# Patient Record
Sex: FEMALE | Race: BLACK OR AFRICAN AMERICAN | NOT HISPANIC OR LATINO | Employment: PART TIME | URBAN - METROPOLITAN AREA
[De-identification: names, ages, dates, MRNs, and addresses within clinical notes are randomized per-mention and may not be internally consistent; named-entity substitution may affect disease eponyms.]

---

## 2024-08-16 ENCOUNTER — HOSPITAL ENCOUNTER (EMERGENCY)
Facility: HOSPITAL | Age: 19
Discharge: HOME | End: 2024-08-16
Attending: EMERGENCY MEDICINE
Payer: COMMERCIAL

## 2024-08-16 VITALS
OXYGEN SATURATION: 100 % | WEIGHT: 212.08 LBS | HEART RATE: 79 BPM | SYSTOLIC BLOOD PRESSURE: 138 MMHG | DIASTOLIC BLOOD PRESSURE: 78 MMHG | BODY MASS INDEX: 33.29 KG/M2 | HEIGHT: 67 IN | RESPIRATION RATE: 18 BRPM | TEMPERATURE: 98 F

## 2024-08-16 DIAGNOSIS — K62.89 RECTAL PAIN: ICD-10-CM

## 2024-08-16 DIAGNOSIS — K64.4 EXTERNAL HEMORRHOIDS: Primary | ICD-10-CM

## 2024-08-16 PROCEDURE — 99284 EMERGENCY DEPT VISIT MOD MDM: CPT | Performed by: EMERGENCY MEDICINE

## 2024-08-16 PROCEDURE — 99282 EMERGENCY DEPT VISIT SF MDM: CPT

## 2024-08-16 RX ORDER — HYDROCORTISONE 25 MG/G
CREAM TOPICAL ONCE
Status: COMPLETED | OUTPATIENT
Start: 2024-08-16 | End: 2024-08-16

## 2024-08-16 RX ORDER — IBUPROFEN 600 MG/1
600 TABLET, FILM COATED ORAL ONCE
Status: COMPLETED | OUTPATIENT
Start: 2024-08-16 | End: 2024-08-16

## 2024-08-16 RX ORDER — HYDROCORTISONE 25 MG/G
CREAM TOPICAL 2 TIMES DAILY
Qty: 28 G | Refills: 0 | Status: SHIPPED | OUTPATIENT
Start: 2024-08-16

## 2024-08-16 RX ADMIN — WITCH HAZEL 1 PAD: 500 SOLUTION RECTAL; TOPICAL at 19:56

## 2024-08-16 RX ADMIN — IBUPROFEN 600 MG: 600 TABLET, FILM COATED ORAL at 19:56

## 2024-08-16 RX ADMIN — HYDROCORTISONE: 25 CREAM TOPICAL at 19:56

## 2024-08-16 NOTE — Clinical Note
Opal Roman was seen and treated in our emergency department on 8/16/2024.                Diagnosis:     Opal  may return to work on return date.    She may return on this date: 08/19/2024         If you have any questions or concerns, please don't hesitate to call.      Nadia Stock, DO    ______________________________           _______________          _______________  Hospital Representative                              Date                                Time

## 2024-08-16 NOTE — ED PROVIDER NOTES
History  Chief Complaint   Patient presents with    Hemorrhoids     C/o anal pain due to hemorrhoids. Said she has had them since she was pregnant but recently they've become painful     Patient is a 19-year-old female with no significant past medical or surgical history, presents to the emergency department complaining of rectal pain related to known external hemorrhoids.  Patient states that she started getting external hemorrhoids during her last pregnancy about 1 year ago.  Her child is now 9 months and she keeps getting hemorrhoids on and off.  She states over the past week the hemorrhoids have been very painful despite taking Tylenol.  She has not tried any topical medication for the hemorrhoids.  She states it hurts to have a bowel movement so she is trying to avoid bowel movements but denies being constipated or straining.  Denies any rectal bleeding, melena, diarrhea, abdominal pain, nausea, vomiting, urinary symptoms, chest pain, palpitations, dyspnea, cough, URI symptoms, fevers or chills.      History provided by:  Patient   used: No        None       History reviewed. No pertinent past medical history.    History reviewed. No pertinent surgical history.    History reviewed. No pertinent family history.  I have reviewed and agree with the history as documented.    E-Cigarette/Vaping    E-Cigarette Use Current Some Day User      E-Cigarette/Vaping Substances     Social History     Tobacco Use    Smoking status: Never    Smokeless tobacco: Never   Vaping Use    Vaping status: Some Days   Substance Use Topics    Alcohol use: Never    Drug use: Yes     Types: Marijuana       Review of Systems   Constitutional:  Negative for chills and fever.   HENT:  Negative for congestion, ear pain, rhinorrhea and sore throat.    Respiratory:  Negative for cough, chest tightness, shortness of breath and wheezing.    Cardiovascular:  Negative for chest pain and palpitations.   Gastrointestinal:  Positive  for rectal pain. Negative for abdominal distention, abdominal pain, blood in stool, constipation, diarrhea, nausea and vomiting.   Genitourinary:  Negative for dysuria, flank pain, frequency, hematuria, pelvic pain, vaginal discharge and vaginal pain.   Musculoskeletal:  Negative for back pain and neck pain.   Skin:  Negative for color change, pallor, rash and wound.   Allergic/Immunologic: Negative for immunocompromised state.   Neurological:  Negative for dizziness, weakness, light-headedness, numbness and headaches.   Hematological:  Negative for adenopathy. Does not bruise/bleed easily.   Psychiatric/Behavioral:  Negative for confusion and decreased concentration.    All other systems reviewed and are negative.      Physical Exam  Physical Exam  Vitals and nursing note reviewed.   Constitutional:       General: She is not in acute distress.     Appearance: Normal appearance. She is well-developed. She is not ill-appearing, toxic-appearing or diaphoretic.   HENT:      Head: Normocephalic and atraumatic.      Right Ear: External ear normal.      Left Ear: External ear normal.      Mouth/Throat:      Mouth: Mucous membranes are moist.      Pharynx: Oropharynx is clear.   Eyes:      Extraocular Movements: Extraocular movements intact.      Conjunctiva/sclera: Conjunctivae normal.   Neck:      Vascular: No JVD.   Cardiovascular:      Rate and Rhythm: Normal rate and regular rhythm.      Pulses: Normal pulses.      Heart sounds: Normal heart sounds. No murmur heard.     No friction rub. No gallop.   Pulmonary:      Effort: Pulmonary effort is normal. No respiratory distress.      Breath sounds: Normal breath sounds. No wheezing, rhonchi or rales.   Abdominal:      General: There is no distension.      Palpations: Abdomen is soft.      Tenderness: There is no abdominal tenderness. There is no guarding or rebound.   Genitourinary:     Comments: Small partially collapsed and nonthrombosed external hemorrhoids.  No anal  "fissures or thrombosed hemorrhoids.  Musculoskeletal:         General: No swelling or tenderness. Normal range of motion.      Cervical back: Normal range of motion and neck supple. No rigidity.   Skin:     General: Skin is warm and dry.      Coloration: Skin is not pale.      Findings: No erythema or rash.   Neurological:      General: No focal deficit present.      Mental Status: She is alert and oriented to person, place, and time.      Sensory: No sensory deficit.      Motor: No weakness.   Psychiatric:         Mood and Affect: Mood normal.         Behavior: Behavior normal.         Vital Signs  ED Triage Vitals [08/16/24 1844]   Temperature Pulse Respirations Blood Pressure SpO2   98 °F (36.7 °C) 79 18 138/78 100 %      Temp Source Heart Rate Source Patient Position - Orthostatic VS BP Location FiO2 (%)   Tympanic Monitor Sitting Left arm --      Pain Score       --         Vitals:    08/16/24 1844   BP: 138/78   BP Location: Left arm   Pulse: 79   Resp: 18   Temp: 98 °F (36.7 °C)   TempSrc: Tympanic   SpO2: 100%   Weight: 96.2 kg (212 lb 1.3 oz)   Height: 5' 7\" (1.702 m)          Visual Acuity      ED Medications  Medications   hydrocortisone (ANUSOL-HC) 2.5 % rectal cream ( Topical Given 8/16/24 1956)   witch hazel-glycerin (TUCKS) topical pad 1 Pad (1 Pad Topical Given 8/16/24 1956)   ibuprofen (MOTRIN) tablet 600 mg (600 mg Oral Given 8/16/24 1956)       Diagnostic Studies  Results Reviewed       None                   No orders to display              Procedures  Procedures         ED Course                                 SBIRT 22yo+      Flowsheet Row Most Recent Value   Initial Alcohol Screen: US AUDIT-C     1. How often do you have a drink containing alcohol? 0 Filed at: 08/16/2024 1846   2. How many drinks containing alcohol do you have on a typical day you are drinking?  0 Filed at: 08/16/2024 1846   3b. FEMALE Any Age, or MALE 65+: How often do you have 4 or more drinks on one occassion? 0 Filed at: " 08/16/2024 1846   Audit-C Score 0 Filed at: 08/16/2024 1846   ABENA: How many times in the past year have you...    Used an illegal drug or used a prescription medication for non-medical reasons? Never Filed at: 08/16/2024 1846                      Medical Decision Making  19-year-old female presents to the ED for rectal pain associated with external hemorrhoids.  Hemorrhoids are visible, nonthrombosed and will treat symptomatically with topical hydrocortisone rectal cream and witch hazel pads.  Will also give ibuprofen for acute pain relief.  Will prescribe these medications to be used at home and also recommended sitz baths.  Advised using stool softeners as needed to avoid straining which could exacerbate hemorrhoids.  Discussed ED return parameters.    Risk  OTC drugs.  Prescription drug management.                 Disposition  Final diagnoses:   External hemorrhoids   Rectal pain     Time reflects when diagnosis was documented in both MDM as applicable and the Disposition within this note       Time User Action Codes Description Comment    8/16/2024  7:29 PM Nadia Stock [K64.4] External hemorrhoids     8/16/2024  7:54 PM Nadia Stock [K62.89] Rectal pain           ED Disposition       ED Disposition   Discharge    Condition   Stable    Date/Time   Fri Aug 16, 2024 1929    Comment   Opal Roman discharge to home/self care.                   Follow-up Information       Follow up With Specialties Details Why Contact Info Additional Information    [facility] [place] [facility] Schedule an appointment as soon as possible for a visit  As needed [address]  [address]  [facility] [address] [phone] [facility] [place], [facility] [address], [place], [place], [address] [phone]     [facility] Emergency Department Emergency Medicine Go to  If symptoms worsen [address] [facility]  [facility] [address]  [phone] [facility] Emergency [facility], [address] [place], [place], [address]            Patient's Medications   Discharge Prescriptions    HYDROCORTISONE (ANUSOL-HC) 2.5 % RECTAL CREAM    Apply topically 2 (two) times a day       Start Date: 8/16/2024 End Date: --       Order Dose: --       Quantity: 28 g    Refills: 0    WITCH HAZEL-GLYCERIN (TUCKS) TOPICAL PAD    Insert 1 Pad into the rectum as needed for irritation       Start Date: 8/16/2024 End Date: --       Order Dose: 1 Pad       Quantity: 20 each    Refills: 0       No discharge procedures on file.    PDMP Review       None            ED Provider  Electronically Signed by             Nadia Stock DO  08/16/24 1959

## 2024-12-16 ENCOUNTER — HOSPITAL ENCOUNTER (EMERGENCY)
Facility: HOSPITAL | Age: 19
Discharge: HOME | End: 2024-12-16
Payer: COMMERCIAL

## 2024-12-16 ENCOUNTER — OFFICE VISIT (OUTPATIENT)
Dept: URGENT CARE | Facility: CLINIC | Age: 19
End: 2024-12-16
Payer: COMMERCIAL

## 2024-12-16 VITALS
WEIGHT: 220 LBS | DIASTOLIC BLOOD PRESSURE: 72 MMHG | SYSTOLIC BLOOD PRESSURE: 141 MMHG | RESPIRATION RATE: 20 BRPM | HEART RATE: 93 BPM | OXYGEN SATURATION: 100 % | TEMPERATURE: 98 F | BODY MASS INDEX: 34.46 KG/M2

## 2024-12-16 VITALS
SYSTOLIC BLOOD PRESSURE: 124 MMHG | DIASTOLIC BLOOD PRESSURE: 76 MMHG | TEMPERATURE: 97.4 F | WEIGHT: 220 LBS | BODY MASS INDEX: 34.46 KG/M2 | OXYGEN SATURATION: 99 % | HEART RATE: 97 BPM | RESPIRATION RATE: 18 BRPM

## 2024-12-16 DIAGNOSIS — R10.84 GENERALIZED ABDOMINAL PAIN: Primary | ICD-10-CM

## 2024-12-16 DIAGNOSIS — Z34.93 PREGNANT AND NOT YET DELIVERED, THIRD TRIMESTER: Primary | ICD-10-CM

## 2024-12-16 DIAGNOSIS — Z32.01 POSITIVE URINE PREGNANCY TEST: ICD-10-CM

## 2024-12-16 DIAGNOSIS — K21.9 GERD (GASTROESOPHAGEAL REFLUX DISEASE): ICD-10-CM

## 2024-12-16 LAB
ALBUMIN SERPL BCG-MCNC: 3.7 G/DL (ref 3.5–5)
ALP SERPL-CCNC: 118 U/L (ref 34–104)
ALT SERPL W P-5'-P-CCNC: 11 U/L (ref 7–52)
ANION GAP SERPL CALCULATED.3IONS-SCNC: 6 MMOL/L (ref 4–13)
AST SERPL W P-5'-P-CCNC: 16 U/L (ref 13–39)
B-HCG SERPL-ACNC: ABNORMAL MIU/ML (ref 0–5)
BACTERIA UR QL AUTO: ABNORMAL /HPF
BASOPHILS # BLD AUTO: 0.02 THOUSANDS/ΜL (ref 0–0.1)
BASOPHILS NFR BLD AUTO: 0 % (ref 0–1)
BILIRUB SERPL-MCNC: 0.33 MG/DL (ref 0.2–1)
BILIRUB UR QL STRIP: NEGATIVE
BUN SERPL-MCNC: 6 MG/DL (ref 5–25)
CALCIUM SERPL-MCNC: 9.2 MG/DL (ref 8.4–10.2)
CARDIAC TROPONIN I PNL SERPL HS: 4 NG/L (ref ?–50)
CHLORIDE SERPL-SCNC: 104 MMOL/L (ref 96–108)
CLARITY UR: CLEAR
CO2 SERPL-SCNC: 24 MMOL/L (ref 21–32)
COLOR UR: YELLOW
CREAT SERPL-MCNC: 0.52 MG/DL (ref 0.6–1.3)
EOSINOPHIL # BLD AUTO: 0.03 THOUSAND/ΜL (ref 0–0.61)
EOSINOPHIL NFR BLD AUTO: 0 % (ref 0–6)
ERYTHROCYTE [DISTWIDTH] IN BLOOD BY AUTOMATED COUNT: 13.1 % (ref 11.6–15.1)
EXT PREGNANCY TEST URINE: POSITIVE
EXT. CONTROL: ABNORMAL
FLUAV AG UPPER RESP QL IA.RAPID: NEGATIVE
FLUBV AG UPPER RESP QL IA.RAPID: NEGATIVE
GFR SERPL CREATININE-BSD FRML MDRD: 137 ML/MIN/1.73SQ M
GLUCOSE SERPL-MCNC: 77 MG/DL (ref 65–140)
GLUCOSE UR STRIP-MCNC: NEGATIVE MG/DL
HCT VFR BLD AUTO: 34.7 % (ref 34.8–46.1)
HGB BLD-MCNC: 11.5 G/DL (ref 11.5–15.4)
HGB UR QL STRIP.AUTO: NEGATIVE
IMM GRANULOCYTES # BLD AUTO: 0.02 THOUSAND/UL (ref 0–0.2)
IMM GRANULOCYTES NFR BLD AUTO: 0 % (ref 0–2)
KETONES UR STRIP-MCNC: ABNORMAL MG/DL
LEUKOCYTE ESTERASE UR QL STRIP: NEGATIVE
LIPASE SERPL-CCNC: 13 U/L (ref 11–82)
LYMPHOCYTES # BLD AUTO: 2.94 THOUSANDS/ΜL (ref 0.6–4.47)
LYMPHOCYTES NFR BLD AUTO: 33 % (ref 14–44)
MCH RBC QN AUTO: 30 PG (ref 26.8–34.3)
MCHC RBC AUTO-ENTMCNC: 33.1 G/DL (ref 31.4–37.4)
MCV RBC AUTO: 91 FL (ref 82–98)
MONOCYTES # BLD AUTO: 0.64 THOUSAND/ΜL (ref 0.17–1.22)
MONOCYTES NFR BLD AUTO: 7 % (ref 4–12)
MUCOUS THREADS UR QL AUTO: ABNORMAL
NEUTROPHILS # BLD AUTO: 5.37 THOUSANDS/ΜL (ref 1.85–7.62)
NEUTS SEG NFR BLD AUTO: 60 % (ref 43–75)
NITRITE UR QL STRIP: NEGATIVE
NON-SQ EPI CELLS URNS QL MICRO: ABNORMAL /HPF
NRBC BLD AUTO-RTO: 0 /100 WBCS
PH UR STRIP.AUTO: 6.5 [PH]
PLATELET # BLD AUTO: 230 THOUSANDS/UL (ref 149–390)
PMV BLD AUTO: 8.9 FL (ref 8.9–12.7)
POTASSIUM SERPL-SCNC: 3.4 MMOL/L (ref 3.5–5.3)
PROT SERPL-MCNC: 7.4 G/DL (ref 6.4–8.4)
PROT UR STRIP-MCNC: ABNORMAL MG/DL
RBC # BLD AUTO: 3.83 MILLION/UL (ref 3.81–5.12)
RBC #/AREA URNS AUTO: ABNORMAL /HPF
SARS-COV+SARS-COV-2 AG RESP QL IA.RAPID: NEGATIVE
SL AMB  POCT GLUCOSE, UA: NORMAL
SL AMB LEUKOCYTE ESTERASE,UA: NORMAL
SL AMB POCT BILIRUBIN,UA: NORMAL
SL AMB POCT BLOOD,UA: NORMAL
SL AMB POCT CLARITY,UA: CLEAR
SL AMB POCT COLOR,UA: YELLOW
SL AMB POCT KETONES,UA: NORMAL
SL AMB POCT NITRITE,UA: NORMAL
SL AMB POCT PH,UA: 6.5
SL AMB POCT SPECIFIC GRAVITY,UA: 1.01
SL AMB POCT URINE HCG: POSITIVE
SL AMB POCT URINE PROTEIN: NORMAL
SL AMB POCT UROBILINOGEN: 0.2
SODIUM SERPL-SCNC: 134 MMOL/L (ref 135–147)
SP GR UR STRIP.AUTO: 1.03 (ref 1–1.03)
UROBILINOGEN UR STRIP-ACNC: <2 MG/DL
WBC # BLD AUTO: 9.02 THOUSAND/UL (ref 4.31–10.16)
WBC #/AREA URNS AUTO: ABNORMAL /HPF

## 2024-12-16 PROCEDURE — 81002 URINALYSIS NONAUTO W/O SCOPE: CPT

## 2024-12-16 PROCEDURE — G0382 LEV 3 HOSP TYPE B ED VISIT: HCPCS

## 2024-12-16 PROCEDURE — 84484 ASSAY OF TROPONIN QUANT: CPT

## 2024-12-16 PROCEDURE — 84702 CHORIONIC GONADOTROPIN TEST: CPT

## 2024-12-16 PROCEDURE — 87804 INFLUENZA ASSAY W/OPTIC: CPT

## 2024-12-16 PROCEDURE — 76815 OB US LIMITED FETUS(S): CPT

## 2024-12-16 PROCEDURE — 36415 COLL VENOUS BLD VENIPUNCTURE: CPT

## 2024-12-16 PROCEDURE — 83690 ASSAY OF LIPASE: CPT

## 2024-12-16 PROCEDURE — 87086 URINE CULTURE/COLONY COUNT: CPT

## 2024-12-16 PROCEDURE — 81025 URINE PREGNANCY TEST: CPT

## 2024-12-16 PROCEDURE — 87811 SARS-COV-2 COVID19 W/OPTIC: CPT

## 2024-12-16 PROCEDURE — 99284 EMERGENCY DEPT VISIT MOD MDM: CPT

## 2024-12-16 PROCEDURE — 81001 URINALYSIS AUTO W/SCOPE: CPT

## 2024-12-16 PROCEDURE — 93005 ELECTROCARDIOGRAM TRACING: CPT

## 2024-12-16 PROCEDURE — 80053 COMPREHEN METABOLIC PANEL: CPT

## 2024-12-16 PROCEDURE — 85025 COMPLETE CBC W/AUTO DIFF WBC: CPT

## 2024-12-16 RX ORDER — FAMOTIDINE 20 MG/1
20 TABLET, FILM COATED ORAL 2 TIMES DAILY
Qty: 30 TABLET | Refills: 0 | Status: SHIPPED | OUTPATIENT
Start: 2024-12-16

## 2024-12-16 RX ORDER — FAMOTIDINE 20 MG/1
20 TABLET, FILM COATED ORAL ONCE
Status: COMPLETED | OUTPATIENT
Start: 2024-12-16 | End: 2024-12-16

## 2024-12-16 RX ORDER — MAGNESIUM HYDROXIDE/ALUMINUM HYDROXICE/SIMETHICONE 120; 1200; 1200 MG/30ML; MG/30ML; MG/30ML
30 SUSPENSION ORAL ONCE
Status: COMPLETED | OUTPATIENT
Start: 2024-12-16 | End: 2024-12-16

## 2024-12-16 RX ORDER — ALUMINUM HYDROXIDE, MAGNESIUM HYDROXIDE, SIMETHICONE 400; 400; 40 MG/10ML; MG/10ML; MG/10ML
30 SUSPENSION ORAL
Qty: 3600 ML | Refills: 0 | Status: SHIPPED | OUTPATIENT
Start: 2024-12-16

## 2024-12-16 RX ADMIN — FAMOTIDINE 20 MG: 20 TABLET, FILM COATED ORAL at 20:15

## 2024-12-16 RX ADMIN — ALUMINUM HYDROXIDE, MAGNESIUM HYDROXIDE, AND DIMETHICONE 30 ML: 200; 20; 200 SUSPENSION ORAL at 20:15

## 2024-12-16 NOTE — Clinical Note
Opal Roman was seen and treated in our emergency department on 12/16/2024.                Diagnosis:     Opal  .    She may return on this date: 12/18/2024         If you have any questions or concerns, please don't hesitate to call.      Martínez Mosquera MD    ______________________________           _______________          _______________  Hospital Representative                              Date                                Time

## 2024-12-16 NOTE — PROGRESS NOTES
[facility]        NAME: Opal Roman is a 19 y.o. female  : 2005    MRN: [id number]  DATE: 2024  TIME: 6:31 PM    Assessment and Plan   Generalized abdominal pain [R10.84]  1. Generalized abdominal pain  Transfer to other facility    POCT urine dip    POCT urine HCG      2. Positive urine pregnancy test          EKG: NSR, HR 87, no acute ST elevation or findings per my read, pending Cardiology final read. Copy provided to patient to present to the ER.     POCT urine HCG: Positive     POCT urine dip:  Color: Yellow  UA, Clarity: Clear  Glucose: Negative  Bilirubin: Negative  Ketones: Negative  Specific gravity: 1.010  Blood: Negative  pH: 6.5  Protein: Negative  Urobilinogen: 0.2  Nitrates: Negative  Leukocytes: Negative    Discussed positive pregnancy result with patient. Reports that her menses were irregular prior to her first child. Pt does not know when she had her last period. Pt denies being on any prenatal vitamins.    Upon chart review, pt's wt during ER visit on 24 noted to be 189 lbs, on 24 noted to be 212 lbs vs today's wt of 220 lbs.     Deferred pt to the ER at this time, pt would like to proceed to [facility].      Patient Instructions     Please proceed to the ER for further evaluation and treatment.     Chief Complaint     Chief Complaint   Patient presents with    Abdominal Pain     Pt c/o abdominal back chest leg pain that are sharp that started a week ago and started to get worse over the past 3 or 4 days      History of Present Illness   Pt is a 20 y/o F who presents to the clinic for a CC of chest/abdominal/back pain.     Denies any past medical history. States she has had heartburn issues since being pregnant and giving birth (2023) which she self medicates with Tums. Pt states she takes Tums at least 3 times per day with relief.     Abdominal Pain  This is a new problem. The current episode started in the past 7 days (Past week,  "worsening over the past 3-4 days). The onset quality is gradual. The problem has been gradually worsening since onset. The pain is located in the epigastric region. The pain is at a severity of 7/10 (At rest reports pain is 4/10 but is intermittent, pain worsens with palpation and is currently a 7/10.). The quality of the pain is described as sharp. Radiates to: goes down midline through her abdomen, then pain is throughout her abdomen, occassionally starting in her lower back and going down bilateral legs or wrapping around to the front of her abdomen. Associated symptoms include myalgias and nausea. Pertinent negatives include no belching, constipation, diarrhea, dysuria, fever, flatus, frequency, headaches, hematuria, rash or vomiting. Nothing relieves the symptoms. Past treatments include antacids. The treatment provided no improvement relief. There is no past medical history of abdominal surgery, chronic gastrointestinal disease, irritable bowel syndrome or a UTI. PMH comments: Pt denies any physical body changes.       Review of Systems   Review of Systems   Constitutional:  Negative for appetite change, chills, diaphoresis and fever.   Respiratory:  Positive for shortness of breath (associated with chest pain, \"when my chest starts hurting, it feels like it's hard to breath\"). Negative for cough and wheezing.    Cardiovascular:  Positive for chest pain. Negative for palpitations.   Gastrointestinal:  Positive for abdominal pain and nausea. Negative for blood in stool, constipation, diarrhea, flatus and vomiting.        Have hemorrhoids, noticed some blood with wiping previously, not related to current abdominal pain   Genitourinary:  Negative for dysuria, frequency, hematuria and urgency.   Musculoskeletal:  Positive for myalgias.   Skin:  Negative for color change, rash and wound.   Neurological:  Negative for headaches.     Current Medications     No current outpatient medications on file.    Current " Allergies     Allergies as of 12/16/2024    (No Known Allergies)            The following portions of the patient's history were reviewed and updated as appropriate: allergies, current medications, past family history, past medical history, past social history, past surgical history and problem list.     History reviewed. No pertinent past medical history.    History reviewed. No pertinent surgical history.    History reviewed. No pertinent family history.      Medications have been verified.        Objective   /76   Pulse 97   Temp (!) 97.4 °F (36.3 °C) (Tympanic)   Resp 18   Wt 99.8 kg (220 lb)   SpO2 99%   BMI 34.46 kg/m²        Physical Exam     Physical Exam  Vitals and nursing note reviewed.   Constitutional:       General: She is not in acute distress.     Appearance: She is not ill-appearing, toxic-appearing or diaphoretic.   HENT:      Head: Normocephalic.   Cardiovascular:      Rate and Rhythm: Normal rate and regular rhythm.      Pulses: Normal pulses.      Heart sounds: Normal heart sounds. No murmur heard.  Pulmonary:      Effort: Pulmonary effort is normal. No respiratory distress.      Breath sounds: Normal breath sounds. No stridor. No wheezing, rhonchi or rales.   Chest:      Chest wall: No tenderness.   Abdominal:      General: Bowel sounds are increased. There is distension (Firmly, consistent with + urine hcg for pregnancy).      Tenderness: There is generalized abdominal tenderness.   Musculoskeletal:         General: Normal range of motion.   Skin:     General: Skin is warm.   Neurological:      Mental Status: She is alert.   Psychiatric:         Mood and Affect: Mood is anxious.

## 2024-12-16 NOTE — Clinical Note
Opal Roman was seen and treated in our emergency department on 12/16/2024.                Diagnosis:     Opal  may return to work on return date.    She may return on this date: 12/18/2024         If you have any questions or concerns, please don't hesitate to call.      China Up RN    ______________________________           _______________          _______________  Hospital Representative                              Date                                Time

## 2024-12-17 LAB
ATRIAL RATE: 81 BPM
BACTERIA UR CULT: NORMAL
P AXIS: 35 DEGREES
PR INTERVAL: 188 MS
QRS AXIS: 90 DEGREES
QRSD INTERVAL: 94 MS
QT INTERVAL: 372 MS
QTC INTERVAL: 432 MS
T WAVE AXIS: 21 DEGREES
VENTRICULAR RATE: 81 BPM

## 2024-12-17 PROCEDURE — 93010 ELECTROCARDIOGRAM REPORT: CPT | Performed by: STUDENT IN AN ORGANIZED HEALTH CARE EDUCATION/TRAINING PROGRAM

## 2024-12-17 NOTE — DISCHARGE INSTRUCTIONS
After discussion with OBGYN, they recommend you present to [facility] for an evaluation due to being in the 3rd trimester to have further testing and to start appropriate management. They recommend you go there from here. This will also ensure that you get appropriate followup and planning as you may deliver in the coming weeks.   The address is:  [address], [place], [place], [address]    I have sent medications to help with your GERD which I believe is causing your stomach/chest discomfort.

## 2024-12-17 NOTE — ED PROVIDER NOTES
Time reflects when diagnosis was documented in both MDM as applicable and the Disposition within this note       Time User Action Codes Description Comment    12/16/2024 10:07 PM Martínez Mosquera Add [Z34.93] Pregnant and not yet delivered, third trimester     12/16/2024 10:07 PM Martínez Mosquera Add [K21.9] GERD (gastroesophageal reflux disease)           ED Disposition       ED Disposition   Discharge    Condition   Stable    Date/Time   Mon Dec 16, 2024 10:07 PM    Comment   Opal Roman discharge to home/self care.                   Assessment & Plan       Medical Decision Making  19-year-old female present emergency department due to what is likely reflux.  Bedside ultrasound showing that patient has around 35 weeks pregnant at this point in time. Reassuring HR and movement on ultrasound. She has not had any prenatal care.  Denies any vaginal bleeding, vaginal discharge, known fetal movement.  Discussed with OB/GYN who recommend transfer to [facility] to OB triage for further evaluation and management given this patient has not had any prenatal care.  Discussed with patient who is appropriate to drive by private car  Will discharge with plan for patient to drive to [facility] for further management.    Amount and/or Complexity of Data Reviewed  Labs: ordered.    Risk  OTC drugs.             Medications   aluminum-magnesium hydroxide-simethicone (MAALOX) oral suspension 30 mL (30 mL Oral Given 12/16/24 2015)   famotidine (PEPCID) tablet 20 mg (20 mg Oral Given 12/16/24 2015)       ED Risk Strat Scores                          SBIRT 22yo+      Flowsheet Row Most Recent Value   Initial Alcohol Screen: US AUDIT-C     1. How often do you have a drink containing alcohol? 0 Filed at: 12/16/2024 1818   2. How many drinks containing alcohol do you have on a typical day you are drinking?  0 Filed at: 12/16/2024 1818   3b. FEMALE Any Age, or MALE 65+: How often do you have 4 or more drinks on one occassion? 0 Filed at:  12/16/2024 1818   Audit-C Score 0 Filed at: 12/16/2024 1818   ABENA: How many times in the past year have you...    Used an illegal drug or used a prescription medication for non-medical reasons? Never Filed at: 12/16/2024 1818                            History of Present Illness       Chief Complaint   Patient presents with    Abdominal Pain     Sent from  c/o CP & abdominal pain over the last few days. Patient recently told she was pregnant while at  so unsure how far along she is.        History reviewed. No pertinent past medical history.   History reviewed. No pertinent surgical history.   History reviewed. No pertinent family history.   Social History     Tobacco Use    Smoking status: Never    Smokeless tobacco: Never   Vaping Use    Vaping status: Some Days   Substance Use Topics    Alcohol use: Never    Drug use: Yes     Types: Marijuana      E-Cigarette/Vaping    E-Cigarette Use Current Some Day User       E-Cigarette/Vaping Substances      I have reviewed and agree with the history as documented.     19-year-old female present emergency department due to epigastric discomfort with burning sensation rating into her chest.  Symptoms have been ongoing for few days but progressively worsening.  Has not tried medications.  Denies any history of similar.  Was seen at urgent care and found to have a positive pregnancy test.  Recommended to come to the ED for further evaluation given this pain radiating up into her chest.  Denies shortness of breath, other chest pains, or other complaints.  Unsure of when her last menstrual cycle was, notes that that is typically regular.  Did not know that she was pregnant till today.        Review of Systems   All other systems reviewed and are negative.          Objective       ED Triage Vitals [12/16/24 1816]   Temperature Pulse Blood Pressure Respirations SpO2 Patient Position - Orthostatic VS   98 °F (36.7 °C) 93 141/72 20 100 % --      Temp Source Heart Rate Source BP  Location FiO2 (%) Pain Score    Oral Monitor -- -- --      Vitals      Date and Time Temp Pulse SpO2 Resp BP Pain Score FACES Pain Rating User   12/16/24 1816 98 °F (36.7 °C) 93 100 % 20 141/72 -- -- AM            Physical Exam  Constitutional:       General: She is not in acute distress.     Appearance: Normal appearance. She is not ill-appearing or toxic-appearing.   HENT:      Head: Normocephalic and atraumatic.      Mouth/Throat:      Mouth: Mucous membranes are moist.   Eyes:      Extraocular Movements: Extraocular movements intact.   Cardiovascular:      Rate and Rhythm: Normal rate and regular rhythm.      Heart sounds: Normal heart sounds.   Pulmonary:      Effort: Pulmonary effort is normal.      Breath sounds: Normal breath sounds.   Abdominal:      Palpations: Abdomen is soft.      Tenderness: There is abdominal tenderness (epigastric).   Musculoskeletal:      Right lower leg: No edema.      Left lower leg: No edema.   Skin:     General: Skin is warm.   Neurological:      Mental Status: She is alert.   Psychiatric:         Mood and Affect: Mood normal.         Results Reviewed       Procedure Component Value Units Date/Time    Urine culture [[id number]] Collected: 12/16/24 2042    Lab Status: Final result Specimen: Urine, Clean Catch Updated: 12/17/24 1741     Urine Culture 30,000-39,000 cfu/ml    hCG, quantitative [[id number]]  (Abnormal) Collected: 12/16/24 2007    Lab Status: Final result Specimen: Blood from Arm, Left Updated: 12/16/24 2152     HCG, Quant 16,835.8 mIU/mL     Narrative:       Expected Ranges:    HCG results between 5.0 and 25.0 mIU/mL may be indicative of early pregnancy but should be interpreted in light of the total clinical presentation.    HCG can rise to detectable levels in jorge luis and post menopausal women (0-11.6 mIU/mL).     Approximate               Approximate HCG  Gestation age          Concentration ( mIU/mL)  _____________          ______________________   Weeks                       HCG values  0.2-1                       5-50  1-2                           2-3                         100-5000  3-4                         500-97524  4-5                         1000-02739  5-6                         09028-176834  6-8                         54162-122800  8-12                        [id number]      Urine Microscopic [[id number]]  (Abnormal) Collected: 12/16/24 2042    Lab Status: Final result Specimen: Urine, Clean Catch Updated: 12/16/24 2103     RBC, UA 1-2 /hpf      WBC, UA 1-2 /hpf      Epithelial Cells Occasional /hpf      Bacteria, UA Occasional /hpf      MUCUS THREADS Occasional     URINE COMMENT --    UA w Reflex to Microscopic w Reflex to Culture [[id number]]  (Abnormal) Collected: 12/16/24 2042    Lab Status: Final result Specimen: Urine, Clean Catch Updated: 12/16/24 2102     Color, UA Yellow     Clarity, UA Clear     Specific Gravity, UA 1.026     pH, UA 6.5     Leukocytes, UA Negative     Nitrite, UA Negative     Protein, UA Trace mg/dl      Glucose, UA Negative mg/dl      Ketones, UA 20 (1+) mg/dl      Urobilinogen, UA <2.0 mg/dl      Bilirubin, UA Negative     Occult Blood, UA Negative     URINE COMMENT --    FLU/COVID Rapid Antigen (30 min. TAT) - Preferred screening test in ED [[id number]]  (Normal) Collected: 12/16/24 2007    Lab Status: Final result Specimen: Nares from Nose Updated: 12/16/24 2059     SARS COV Rapid Antigen Negative     Influenza A Rapid Antigen Negative     Influenza B Rapid Antigen Negative    Narrative:      This test has been performed using the Quidel Gisel 2 FLU+SARS Antigen test under the Emergency Use Authorization (EUA). This test has been validated by the  and verified by the performing laboratory. The Gisel uses lateral flow immunofluorescent sandwich assay to detect SARS-COV, Influenza A and Influenza B Antigen.     The Quidel Gisel 2 SARS Antigen test does not differentiate between SARS-CoV and SARS-CoV-2.     Negative  results are presumptive and may be confirmed with a molecular assay, if necessary, for patient management. Negative results do not rule out SARS-CoV-2 or influenza infection and should not be used as the sole basis for treatment or patient management decisions. A negative test result may occur if the level of antigen in a sample is below the limit of detection of this test.     Positive results are indicative of the presence of viral antigens, but do not rule out bacterial infection or co-infection with other viruses.     All test results should be used as an adjunct to clinical observations and other information available to the provider.    FOR PEDIATRIC PATIENTS - copy/paste COVID Guidelines URL to browser: [email or web address]    HS Troponin 0hr (reflex protocol) [[id number]]  (Normal) Collected: 12/16/24 2012    Lab Status: Final result Specimen: Blood from Arm, Left Updated: 12/16/24 2057     hs TnI 0hr 4 ng/L     Comprehensive metabolic panel [[id number]]  (Abnormal) Collected: 12/16/24 2007    Lab Status: Final result Specimen: Blood from Arm, Left Updated: 12/16/24 2052     Sodium 134 mmol/L      Potassium 3.4 mmol/L      Chloride 104 mmol/L      CO2 24 mmol/L      ANION GAP 6 mmol/L      BUN 6 mg/dL      Creatinine 0.52 mg/dL      Glucose 77 mg/dL      Calcium 9.2 mg/dL      AST 16 U/L      ALT 11 U/L      Alkaline Phosphatase 118 U/L      Total Protein 7.4 g/dL      Albumin 3.7 g/dL      Total Bilirubin 0.33 mg/dL      eGFR 137 ml/min/1.73sq m     Narrative:      National Kidney Disease Foundation guidelines for Chronic Kidney Disease (CKD):     Stage 1 with normal or high GFR (GFR > 90 mL/min/1.73 square meters)    Stage 2 Mild CKD (GFR = 60-89 mL/min/1.73 square meters)    Stage 3A Moderate CKD (GFR = 45-59 mL/min/1.73 square meters)    Stage 3B Moderate CKD (GFR = 30-44 mL/min/1.73 square meters)    Stage 4 Severe CKD (GFR = 15-29 mL/min/1.73 square  meters)    Stage 5 End Stage CKD (GFR <15 mL/min/1.73 square meters)  Note: GFR calculation is accurate only with a steady state creatinine    Lipase [[id number]]  (Normal) Collected: 12/16/24 2007    Lab Status: Final result Specimen: Blood from Arm, Left Updated: 12/16/24 2052     Lipase 13 u/L     POCT pregnancy, urine [[id number]]  (Abnormal) Collected: 12/16/24 2047    Lab Status: Final result Updated: 12/16/24 2047     EXT Preg Test, Ur Positive     Control Valid    CBC and differential [[id number]]  (Abnormal) Collected: 12/16/24 2007    Lab Status: Final result Specimen: Blood from Foot, Right Updated: 12/16/24 2019     WBC 9.02 Thousand/uL      RBC 3.83 Million/uL      Hemoglobin 11.5 g/dL      Hematocrit 34.7 %      MCV 91 fL      MCH 30.0 pg      MCHC 33.1 g/dL      RDW 13.1 %      MPV 8.9 fL      Platelets 230 Thousands/uL      nRBC 0 /100 WBCs      Segmented % 60 %      Immature Grans % 0 %      Lymphocytes % 33 %      Monocytes % 7 %      Eosinophils Relative 0 %      Basophils Relative 0 %      Absolute Neutrophils 5.37 Thousands/µL      Absolute Immature Grans 0.02 Thousand/uL      Absolute Lymphocytes 2.94 Thousands/µL      Absolute Monocytes 0.64 Thousand/µL      Eosinophils Absolute 0.03 Thousand/µL      Basophils Absolute 0.02 Thousands/µL             No orders to display       POC Pelvic US    Date/Time: 12/16/2024 10:00 PM    Performed by: Martínez Mosquera MD  Authorized by: Martínez Mosquera MD    Patient location:  ED  Procedure details:     Exam Type:  Diagnostic    Indications: evaluate for IUP      Assessment for: determine estimated gestational age      Technique:  Transabdominal obstetric (HCG+) exam    Views obtained: uterus (transverse and sagittal)      Image quality: limited diagnostic      Image availability:  Images available in PACS  Uterine findings:     Endometrial stripe: identified      Intrauterine pregnancy: identified      Single gestation: identified      Multiple gestation:  not identified      Gestational sac: not identified      Yolk sac: not identified      Fetal heart rate: identified    Right adnexa findings:     Right ovary:  Not visualized  Left adnexa findings:     Left ovary:  Not visualized  Other findings:     Free pelvic fluid: not identified      Free peritoneal fluid: not identified    Interpretation:     Ultrasound impressions: normal      Pregnancy findings: intrauterine pregnancy (IUP)      Estimated gestational age (weeks):  35  Comments:      By femur length, age estimated at about 35 wks      ED Medication and Procedure Management   None     Discharge Medication List as of 12/16/2024 10:19 PM        START taking these medications    Details   aluminum-magnesium hydroxide-simethicone (MAALOX) 200-200-20 MG/5ML SUSP Take 30 mL by mouth 4 (four) times a day (before meals and at bedtime), Starting Mon 12/16/2024, Normal      famotidine (PEPCID) 20 mg tablet Take 1 tablet (20 mg total) by mouth 2 (two) times a day, Starting Mon 12/16/2024, Normal             ED SEPSIS DOCUMENTATION   Time reflects when diagnosis was documented in both MDM as applicable and the Disposition within this note       Time User Action Codes Description Comment    12/16/2024 10:07 PM Martínez Mosquera [Z34.93] Pregnant and not yet delivered, third trimester     12/16/2024 10:07 PM Martínez Mosquera [K21.9] GERD (gastroesophageal reflux disease)                  Martínez Mosquera MD  12/22/24 1921

## 2025-01-13 ENCOUNTER — HOSPITAL ENCOUNTER (EMERGENCY)
Facility: HOSPITAL | Age: 20
Discharge: ANOTHER HOSPITAL | End: 2025-01-14
Attending: EMERGENCY MEDICINE
Payer: COMMERCIAL

## 2025-01-13 DIAGNOSIS — R10.9 ABDOMINAL PAIN IN PREGNANCY: Primary | ICD-10-CM

## 2025-01-13 DIAGNOSIS — O26.899 ABDOMINAL PAIN IN PREGNANCY: Primary | ICD-10-CM

## 2025-01-13 PROCEDURE — 99283 EMERGENCY DEPT VISIT LOW MDM: CPT

## 2025-01-13 PROCEDURE — 99285 EMERGENCY DEPT VISIT HI MDM: CPT | Performed by: EMERGENCY MEDICINE

## 2025-01-13 RX ORDER — ACETAMINOPHEN 325 MG/1
650 TABLET ORAL ONCE
Status: COMPLETED | OUTPATIENT
Start: 2025-01-13 | End: 2025-01-13

## 2025-01-13 RX ADMIN — ACETAMINOPHEN 650 MG: 325 TABLET, FILM COATED ORAL at 23:47

## 2025-01-14 VITALS
SYSTOLIC BLOOD PRESSURE: 132 MMHG | DIASTOLIC BLOOD PRESSURE: 85 MMHG | OXYGEN SATURATION: 100 % | RESPIRATION RATE: 26 BRPM | HEART RATE: 72 BPM

## 2025-01-14 NOTE — ED PROVIDER NOTES
Time reflects when diagnosis was documented in both MDM as applicable and the Disposition within this note       Time User Action Codes Description Comment    2025 11:26 PM Julian Lerner Add [O26.899,  R10.9] Abdominal pain in pregnancy           ED Disposition       ED Disposition   Transfer to Another Facility - Out of Network    Condition   --    Date/Time    11:27 PM    Comment   Opal Roman should be transferred out to [facility].               Assessment & Plan       Medical Decision Making  19-year-old female  thought to be in the third trimester presents to the emergency room with intermittent episodes of abdominal pain since last night.    Patient denies any leakage of fluid or any bleeding.    Patient notes the pain is similar to prior episodes of labor pain.    Patient has had no prenatal care.  Patient was seen at this emergency room in December and transfer was arranged to [facility] for evaluation by obstetrics though the patient did not apparently presented to the facility and she had declined ambulance transport.    Impression and plan: Abdominal pain in pregnancy, significant concerns for active labor.  Patient is not , episodes approximately every 12 minutes.  Reassuring fetal heart rate is 146 as performed myself with bedside ultrasound though I do not appreciate significant fetal movement.  Unfortunately, we do not have obstetrics at this facility so I discussed options regarding transfer to the patient.  Patient prefers the closest facility which is [facility].  I discussed with [facility] obstetrics who accepted the patient for transfer.    Risk  OTC drugs.             Medications   acetaminophen (TYLENOL) tablet 650 mg (650 mg Oral Given 25 2376)       ED Risk Strat Scores                          SBIRT 20yo+      Flowsheet Row Most Recent Value   Initial Alcohol Screen: US AUDIT-C     1. How often do you have a drink containing  alcohol? 0 Filed at: 01/13/2025 2313   2. How many drinks containing alcohol do you have on a typical day you are drinking?  0 Filed at: 01/13/2025 2313   3a. Male UNDER 65: How often do you have five or more drinks on one occasion? 0 Filed at: 01/13/2025 2313   3b. FEMALE Any Age, or MALE 65+: How often do you have 4 or more drinks on one occassion? 0 Filed at: 01/13/2025 2313   Audit-C Score 0 Filed at: 01/13/2025 2313   ABENA: How many times in the past year have you...    Used an illegal drug or used a prescription medication for non-medical reasons? Never Filed at: 01/13/2025 2313                            History of Present Illness       Chief Complaint   Patient presents with    Abdominal Pain Pregnant     Patient reports abdominal pain for the last day or so. Patient reports she is in her 3rd trimester, no OBGYN care. 2nd pregnancy, 1 baby at home. Denies leakage of fluids       History reviewed. No pertinent past medical history.   History reviewed. No pertinent surgical history.   History reviewed. No pertinent family history.   Social History     Tobacco Use    Smoking status: Never    Smokeless tobacco: Never   Vaping Use    Vaping status: Some Days   Substance Use Topics    Alcohol use: Never    Drug use: Yes     Types: Marijuana      E-Cigarette/Vaping    E-Cigarette Use Current Some Day User       E-Cigarette/Vaping Substances      I have reviewed and agree with the history as documented.     HPI    Review of Systems        Objective       ED Triage Vitals [01/13/25 2310]   Temp Pulse Blood Pressure Respirations SpO2 Patient Position - Orthostatic VS   -- 67 (!) 138/104 (!) 24 100 % --      Temp src Heart Rate Source BP Location FiO2 (%) Pain Score    -- Monitor -- -- --      Vitals      Date and Time Temp Pulse SpO2 Resp BP Pain Score FACES Pain Rating User   01/14/25 0000 -- 72 100 % 26 132/85 -- --    01/13/25 2345 -- 73 100 % -- 130/81 -- --    01/13/25 2310 -- 67 100 % 24 138/104 -- -- KE             Physical Exam  Exam conducted with a chaperone present.   Constitutional:       Appearance: Normal appearance.   Cardiovascular:      Rate and Rhythm: Normal rate and regular rhythm.   Pulmonary:      Effort: Pulmonary effort is normal.   Abdominal:      Tenderness: There is abdominal tenderness. There is no guarding or rebound.      Comments: Gravid.   Genitourinary:     Comments: The fetus appears to be neutral station, no .  Musculoskeletal:         General: No swelling or tenderness.   Neurological:      Mental Status: She is alert.         Results Reviewed       None            No orders to display       Procedures    ED Medication and Procedure Management   Prior to Admission Medications   Prescriptions Last Dose Informant Patient Reported? Taking?   aluminum-magnesium hydroxide-simethicone (MAALOX) 200-200-20 MG/5ML SUSP   No No   Sig: Take 30 mL by mouth 4 (four) times a day (before meals and at bedtime)   famotidine (PEPCID) 20 mg tablet   No No   Sig: Take 1 tablet (20 mg total) by mouth 2 (two) times a day      Facility-Administered Medications: None     Discharge Medication List as of 2025 12:22 AM        CONTINUE these medications which have NOT CHANGED    Details   aluminum-magnesium hydroxide-simethicone (MAALOX) 200-200-20 MG/5ML SUSP Take 30 mL by mouth 4 (four) times a day (before meals and at bedtime), Starting Mon 2024, Normal      famotidine (PEPCID) 20 mg tablet Take 1 tablet (20 mg total) by mouth 2 (two) times a day, Starting Mon 2024, Normal           No discharge procedures on file.  ED SEPSIS DOCUMENTATION   Time reflects when diagnosis was documented in both MDM as applicable and the Disposition within this note       Time User Action Codes Description Comment    2025 11:26 PM Julian Lerner Add [O26.899,  R10.9] Abdominal pain in pregnancy                  Julian Lerner MD  25 0421

## 2025-01-14 NOTE — EMTALA/ACUTE CARE TRANSFER
[facility] EMERGENCY DEPARTMENT  [address]  [facility] [address]  Dept: [phone]      EMTALA TRANSFER CONSENT    NAME Opal VERDE 2005                              MRN [id number]    I have been informed of my rights regarding examination, treatment, and transfer   by Dr. Julian Lerner MD    Benefits: Specialized equipment and/or services available at the receiving facility (Include comment)________________________ (OBGYN)    Risks: Potential for delay in receiving treatment, Potential deterioration of medical condition, Increased discomfort during transfer, Loss of IV, Possible worsening of condition or death during transfer      Consent for Transfer:  I acknowledge that my medical condition has been evaluated and explained to me by the emergency department physician or other qualified medical person and/or my attending physician, who has recommended that I be transferred to the service of  Accepting Physician: Lorena at Accepting Facility Name, City & State : [facility]. The above potential benefits of such transfer, the potential risks associated with such transfer, and the probable risks of not being transferred have been explained to me, and I fully understand them.  The doctor has explained that, in my case, the benefits of transfer outweigh the risks.  I agree to be transferred.    I authorize the performance of emergency medical procedures and treatments upon me in both transit and upon arrival at the receiving facility.  Additionally, I authorize the release of any and all medical records to the receiving facility and request they be transported with me, if possible.  I understand that the safest mode of transportation during a medical emergency is an ambulance and that the Hospital advocates the use of this mode of transport. Risks of traveling to the receiving facility by car, including absence of medical control, life sustaining  equipment, such as oxygen, and medical personnel has been explained to me and I fully understand them.    (SHAVON CORRECT BOX BELOW)  [  ]  I consent to the stated transfer and to be transported by ambulance/helicopter.  [  ]  I consent to the stated transfer, but refuse transportation by ambulance and accept full responsibility for my transportation by car.  I understand the risks of non-ambulance transfers and I exonerate the Hospital and its staff from any deterioration in my condition that results from this refusal.    X___________________________________________    DATE  25  TIME________  Signature of patient or legally responsible individual signing on patient behalf           RELATIONSHIP TO PATIENT_________________________          Provider Certification    NAME Opal Rmoan                                        [facility] 2005                              MRN [id number]    A medical screening exam was performed on the above named patient.  Based on the examination:    Condition Necessitating Transfer The encounter diagnosis was Abdominal pain in pregnancy.    Patient Condition: The patient has been stabilized such that within reasonable medical probability, no material deterioration of the patient condition or the condition of the unborn child(uday) is likely to result from the transfer, Pregnant woman having contractions    Reason for Transfer: Level of Care needed not available at this facility    Transfer Requirements: Facility LVP   Space available and qualified personnel available for treatment as acknowledged by PACS  Agreed to accept transfer and to provide appropriate medical treatment as acknowledged by       Lorena  Appropriate medical records of the examination and treatment of the patient are provided at the time of transfer   STAFF INITIAL WHEN COMPLETED _______  Transfer will be performed by qualified personnel from [facility]  and appropriate transfer equipment as required, including the use of  necessary and appropriate life support measures.    Provider Certification: I have examined the patient and explained the following risks and benefits of being transferred/refusing transfer to the patient/family:  General risk, such as traffic hazards, adverse weather conditions, rough terrain or turbulence, possible failure of equipment (including vehicle or aircraft), or consequences of actions of persons outside the control of the transport personnel, Unanticipated needs of medical equipment and personnel during transport, Risk of worsening condition, The possibility of a transport vehicle being unavailable      Based on these reasonable risks and benefits to the patient and/or the unborn child(uday), and based upon the information available at the time of the patient’s examination, I certify that the medical benefits reasonably to be expected from the provision of appropriate medical treatments at another medical facility outweigh the increasing risks, if any, to the individual’s medical condition, and in the case of labor to the unborn child, from effecting the transfer.    X____________________________________________ DATE 01/13/25        TIME_______      ORIGINAL - SEND TO MEDICAL RECORDS   COPY - SEND WITH PATIENT DURING TRANSFER